# Patient Record
Sex: MALE | Race: WHITE | NOT HISPANIC OR LATINO
[De-identification: names, ages, dates, MRNs, and addresses within clinical notes are randomized per-mention and may not be internally consistent; named-entity substitution may affect disease eponyms.]

---

## 2019-04-12 ENCOUNTER — APPOINTMENT (OUTPATIENT)
Dept: CARDIOLOGY | Facility: CLINIC | Age: 64
End: 2019-04-12
Payer: COMMERCIAL

## 2019-04-12 ENCOUNTER — NON-APPOINTMENT (OUTPATIENT)
Age: 64
End: 2019-04-12

## 2019-04-12 VITALS — DIASTOLIC BLOOD PRESSURE: 66 MMHG | WEIGHT: 220 LBS | BODY MASS INDEX: 36.61 KG/M2 | SYSTOLIC BLOOD PRESSURE: 132 MMHG

## 2019-04-12 VITALS
HEART RATE: 83 BPM | WEIGHT: 228 LBS | SYSTOLIC BLOOD PRESSURE: 156 MMHG | RESPIRATION RATE: 15 BRPM | BODY MASS INDEX: 37.99 KG/M2 | DIASTOLIC BLOOD PRESSURE: 90 MMHG | HEIGHT: 65 IN

## 2019-04-12 DIAGNOSIS — Z00.00 ENCOUNTER FOR GENERAL ADULT MEDICAL EXAMINATION W/OUT ABNORMAL FINDINGS: ICD-10-CM

## 2019-04-12 PROCEDURE — 99244 OFF/OP CNSLTJ NEW/EST MOD 40: CPT

## 2019-04-12 PROCEDURE — 93000 ELECTROCARDIOGRAM COMPLETE: CPT

## 2019-04-15 ENCOUNTER — APPOINTMENT (OUTPATIENT)
Dept: CARDIOLOGY | Facility: CLINIC | Age: 64
End: 2019-04-15
Payer: COMMERCIAL

## 2019-04-15 PROCEDURE — 93306 TTE W/DOPPLER COMPLETE: CPT

## 2019-04-18 ENCOUNTER — APPOINTMENT (OUTPATIENT)
Dept: CARDIOLOGY | Facility: CLINIC | Age: 64
End: 2019-04-18
Payer: COMMERCIAL

## 2019-04-18 ENCOUNTER — MED ADMIN CHARGE (OUTPATIENT)
Age: 64
End: 2019-04-18

## 2019-04-18 PROCEDURE — 78452 HT MUSCLE IMAGE SPECT MULT: CPT

## 2019-04-18 PROCEDURE — 93015 CV STRESS TEST SUPVJ I&R: CPT

## 2019-04-18 PROCEDURE — A9500: CPT

## 2019-04-19 RX ORDER — REGADENOSON 0.08 MG/ML
0.4 INJECTION, SOLUTION INTRAVENOUS
Qty: 1 | Refills: 0 | Status: COMPLETED | OUTPATIENT
Start: 2019-04-19

## 2019-04-19 RX ADMIN — REGADENOSON 0 MG/5ML: 0.08 INJECTION, SOLUTION INTRAVENOUS at 00:00

## 2019-05-03 RX ORDER — KIT FOR THE PREPARATION OF TECHNETIUM TC99M SESTAMIBI 1 MG/5ML
INJECTION, POWDER, LYOPHILIZED, FOR SOLUTION PARENTERAL
Refills: 0 | Status: COMPLETED | OUTPATIENT
Start: 2019-05-03

## 2019-05-03 RX ADMIN — KIT FOR THE PREPARATION OF TECHNETIUM TC99M SESTAMIBI 0: 1 INJECTION, POWDER, LYOPHILIZED, FOR SOLUTION PARENTERAL at 00:00

## 2021-04-21 ENCOUNTER — APPOINTMENT (OUTPATIENT)
Dept: CARDIOLOGY | Facility: CLINIC | Age: 66
End: 2021-04-21
Payer: COMMERCIAL

## 2021-04-21 ENCOUNTER — NON-APPOINTMENT (OUTPATIENT)
Age: 66
End: 2021-04-21

## 2021-04-21 VITALS
WEIGHT: 236 LBS | SYSTOLIC BLOOD PRESSURE: 128 MMHG | HEIGHT: 60 IN | DIASTOLIC BLOOD PRESSURE: 64 MMHG | HEART RATE: 70 BPM | TEMPERATURE: 97.5 F | RESPIRATION RATE: 15 BRPM | BODY MASS INDEX: 46.33 KG/M2

## 2021-04-21 DIAGNOSIS — R09.89 OTHER SPECIFIED SYMPTOMS AND SIGNS INVOLVING THE CIRCULATORY AND RESPIRATORY SYSTEMS: ICD-10-CM

## 2021-04-21 PROCEDURE — 99214 OFFICE O/P EST MOD 30 MIN: CPT

## 2021-04-21 PROCEDURE — 93000 ELECTROCARDIOGRAM COMPLETE: CPT

## 2021-04-21 PROCEDURE — 99072 ADDL SUPL MATRL&STAF TM PHE: CPT

## 2021-04-21 NOTE — REASON FOR VISIT
[Follow-Up - Clinic] : a clinic follow-up of [FreeTextEntry1] : The patient is a 65-year-old  gentleman with known history for borderline hypertension in the past and borderline abnormal EKG with obesity who was evaluated a few years ago for cardiac clearance to undergo total left knee replacement surgery ;\par \par Presents back to the office today for general cardiac checkup. He reports he gets occasional fleeting exertional dyspnea and is trying to get back into shape but has gained additional weight over the past few years and now trying to follow a low carbohydrate diet;

## 2021-04-21 NOTE — HISTORY OF PRESENT ILLNESS
[FreeTextEntry1] : Nuclear stress test in 2017 was negative for ischemia on myocardial perfusion images;\par \par He is tolerating current medical regimen and blood pressure is well controlled;\par \par

## 2021-04-21 NOTE — PHYSICAL EXAM
[Normal Appearance] : normal appearance [Normal Conjunctiva] : the conjunctiva exhibited no abnormalities [Eyelids - No Xanthelasma] : the eyelids demonstrated no xanthelasmas [Normal Oral Mucosa] : normal oral mucosa [No Oral Pallor] : no oral pallor [No Oral Cyanosis] : no oral cyanosis [Normal Jugular Venous A Waves Present] : normal jugular venous A waves present [Normal Jugular Venous V Waves Present] : normal jugular venous V waves present [No Jugular Venous Triana A Waves] : no jugular venous triana A waves [Respiration, Rhythm And Depth] : normal respiratory rhythm and effort [Exaggerated Use Of Accessory Muscles For Inspiration] : no accessory muscle use [Auscultation Breath Sounds / Voice Sounds] : lungs were clear to auscultation bilaterally [Abdomen Soft] : soft [Abdomen Mass (___ Cm)] : no abdominal mass palpated [FreeTextEntry1] : obese, soft, nontender [Abnormal Walk] : normal gait [Gait - Sufficient For Exercise Testing] : the gait was sufficient for exercise testing [Nail Clubbing] : no clubbing of the fingernails [Cyanosis, Localized] : no localized cyanosis [Petechial Hemorrhages (___cm)] : no petechial hemorrhages [Skin Color & Pigmentation] : normal skin color and pigmentation [] : no rash [No Venous Stasis] : no venous stasis [Skin Lesions] : no skin lesions [No Skin Ulcers] : no skin ulcer [No Xanthoma] : no  xanthoma was observed [Oriented To Time, Place, And Person] : oriented to person, place, and time [Affect] : the affect was normal [Mood] : the mood was normal [No Anxiety] : not feeling anxious

## 2021-09-14 ENCOUNTER — APPOINTMENT (OUTPATIENT)
Dept: CARDIOLOGY | Facility: CLINIC | Age: 66
End: 2021-09-14
Payer: COMMERCIAL

## 2021-09-14 PROCEDURE — 93306 TTE W/DOPPLER COMPLETE: CPT

## 2021-09-14 PROCEDURE — 93880 EXTRACRANIAL BILAT STUDY: CPT

## 2021-09-30 ENCOUNTER — APPOINTMENT (OUTPATIENT)
Dept: CARDIOLOGY | Facility: CLINIC | Age: 66
End: 2021-09-30

## 2023-02-16 ENCOUNTER — APPOINTMENT (OUTPATIENT)
Dept: CARDIOLOGY | Facility: CLINIC | Age: 68
End: 2023-02-16
Payer: COMMERCIAL

## 2023-02-16 ENCOUNTER — NON-APPOINTMENT (OUTPATIENT)
Age: 68
End: 2023-02-16

## 2023-02-16 VITALS
HEART RATE: 87 BPM | BODY MASS INDEX: 41.15 KG/M2 | SYSTOLIC BLOOD PRESSURE: 134 MMHG | WEIGHT: 247 LBS | HEIGHT: 65 IN | DIASTOLIC BLOOD PRESSURE: 78 MMHG | RESPIRATION RATE: 16 BRPM

## 2023-02-16 VITALS — DIASTOLIC BLOOD PRESSURE: 90 MMHG | SYSTOLIC BLOOD PRESSURE: 132 MMHG

## 2023-02-16 DIAGNOSIS — I38 ENDOCARDITIS, VALVE UNSPECIFIED: ICD-10-CM

## 2023-02-16 DIAGNOSIS — E66.9 OBESITY, UNSPECIFIED: ICD-10-CM

## 2023-02-16 DIAGNOSIS — E11.9 TYPE 2 DIABETES MELLITUS W/OUT COMPLICATIONS: ICD-10-CM

## 2023-02-16 PROCEDURE — 93000 ELECTROCARDIOGRAM COMPLETE: CPT

## 2023-02-16 PROCEDURE — 99215 OFFICE O/P EST HI 40 MIN: CPT

## 2023-02-27 NOTE — HISTORY OF PRESENT ILLNESS
[FreeTextEntry1] : Nuclear stress test in 2019 was negative for ischemia on myocardial perfusion images;\par \par He is tolerating current medical regimen and blood pressure is well controlled with borderline elevated diastolic pressure;\par \par His PCP (Dr. Blackwell) recently performed fasting labs which demonstrated mild diabetes with glucose (130) and (HgA1C of 6.6%).  His cholesterol was also slightly elevated with Total (213) and LDL (154)

## 2023-02-27 NOTE — ASSESSMENT
[FreeTextEntry1] : EKG shows normal sinus rhythm with frequent PACs at a rate of 87; Anterolateral ST-elevation - repolarization variant.  no acute changes noted; \par \par In summary this 67-year-old  gentleman has a history for total knee replacement surgery, obesity, and borderline hypertension and past with occasional exertional dyspnea;\par \par Recent fasting blood work demonstrated mild diabetes with HgA1C (6.6%), has not discussed this with his PCP yet but will do so in near future ?Metformin;\par \par Blood work also demonstrated an increase in his Total Cholesterol and LDL levels, and now has increased risk of cardiovascular disease with increased age, obesity, diabetes, and borderline hypertension.\par \par \par \par PLAN:\par \par Recommendation schedule echocardiogram study prior to next visit within 3-4 months; \par \par Will empirically have him begin taking Crestor 5 mg nightly.  Repeat fasting labs prior to follow up to reassess cholesterol levels;\par \par Reinforce importance of low-carb, low fat, low-sodium weight reducing diet;\par \par Moderate physical exercise encouraged;\par \par Continued timely checkups and laboratory blood tests with primary care recommend.  Discuss recent diabetes found on blood work in near future ?Metformin;\par \par Recommend patient report any untoward symptoms;\par \par Follow up with Dr. Arguello in 3-4 months or PRN.

## 2023-02-27 NOTE — REASON FOR VISIT
[FreeTextEntry1] : BRIAN FRENCH is being seen for a clinic follow-up of. \par \par The patient is a 67-year-old  gentleman with known history for borderline hypertension in the past and borderline abnormal EKG with obesity who was evaluated a few years ago for cardiac clearance to undergo total left knee replacement surgery ;\par \par Presents back to the office today for general cardiac checkup;\par \par Apparently, he recently underwent total right knee replacement surgery and he is currently going through physical therapy with much improvement;\par \par  He reports he gets occasional fleeting exertional dyspnea and is trying to get back into shape but has gained additional weight over the past few years and now trying to follow a low carbohydrate diet; \par \par Patient denies associated symptoms such as CP, orthopnea, PND, palpitations, presyncope, syncope, edema.

## 2023-06-14 ENCOUNTER — APPOINTMENT (OUTPATIENT)
Dept: CARDIOLOGY | Facility: CLINIC | Age: 68
End: 2023-06-14
Payer: COMMERCIAL

## 2023-06-14 PROCEDURE — 93306 TTE W/DOPPLER COMPLETE: CPT

## 2023-07-18 ENCOUNTER — NON-APPOINTMENT (OUTPATIENT)
Age: 68
End: 2023-07-18

## 2023-07-18 ENCOUNTER — APPOINTMENT (OUTPATIENT)
Dept: CARDIOLOGY | Facility: CLINIC | Age: 68
End: 2023-07-18
Payer: COMMERCIAL

## 2023-07-18 VITALS
HEIGHT: 65 IN | WEIGHT: 235 LBS | SYSTOLIC BLOOD PRESSURE: 120 MMHG | BODY MASS INDEX: 39.15 KG/M2 | RESPIRATION RATE: 16 BRPM | DIASTOLIC BLOOD PRESSURE: 82 MMHG | HEART RATE: 64 BPM

## 2023-07-18 PROCEDURE — 99214 OFFICE O/P EST MOD 30 MIN: CPT

## 2023-07-18 PROCEDURE — 93000 ELECTROCARDIOGRAM COMPLETE: CPT

## 2023-07-18 NOTE — HISTORY OF PRESENT ILLNESS
[FreeTextEntry1] : Patient was placed on rosuvastatin 5 mg daily since his last lipid panel was noted to be elevated cholesterol and LDL cholesterol ;\par \par Transthoracic echocardiogram from June 14, 2023 showed borderline left ventricular hypertrophy with preserved LVEF of 60 to 65%.  There was mild calcification of the aortic leaflets but no aortic stenosis.  There was no pericardial effusion.  The ascending aorta and aortic root was normal in size;\par \par \par nuclear stress test in 2019 was negative for ischemia on myocardial perfusion images;\par \par He is tolerating current medical regimen and blood pressure is well controlled with borderline elevated diastolic pressure;\par \par Patient states that he had recent laboratory blood tests and lipid profile for which we will send for copy of results;

## 2023-07-18 NOTE — ASSESSMENT
[FreeTextEntry1] : EKG shows normal sinus rhythm with frequent PACs at a rate of 64; Anterolateral ST-elevation - repolarization variant.  no acute changes noted; essentially unchanged;\par \par In summary this 68-year-old  gentleman has a history for total knee replacement surgery, obesity, and borderline hypertension and past with occasional exertional dyspnea; he carries some significant cardiac risk factors including borderline diabetes and hyperlipidemia;\par \par Recent fasting blood work demonstrated mild diabetes with HgA1C (6.6%), has not discussed this with his PCP yet but will do so in near future ?Metformin;\par \par Blood work also demonstrated an increase in his Total Cholesterol and LDL levels, and now has increased risk of cardiovascular disease with increased age, obesity, diabetes, and borderline hypertension.\par \par \par \par PLAN:\par \par Recommend patient schedule nuclear stress test with pharmacologic protocol–2-day protocol sometime in the late summer or fall; \par \par Recommend continue Crestor 5 mg nightly.  We will review recent cholesterol profile now while on antilipemic therapy;\par \par Reinforce importance of low-carb, low fat, low-sodium weight reducing diet;\par \par Moderate physical exercise encouraged -especially if nuclear stress test appears favorable;\par \par Continued timely checkups and laboratory blood tests with primary care recommend.  \par \par Recommend patient report any untoward symptoms;\par \par Follow up within 3 to 4 months or post stress test;

## 2023-07-18 NOTE — REASON FOR VISIT
[FreeTextEntry1] : BRIAN FRENCH is being seen for a clinic follow-up of. \par \par The patient is a 68-year-old  gentleman with known history for borderline hypertension in the past and borderline abnormal EKG with obesity who was evaluated a few years ago for cardiac clearance to undergo total left knee replacement surgery ;\par \par Presents back to the office today for general cardiac checkup, and to discuss results of recent cardiovascular testing (echocardiogram;)\par \par Overall, patient reports he has been generally feeling well and has had no significant chest pain, palpitations, dizziness or syncope; he has been trying to lose some weight recently and does admit to occasional exertional dyspnea for which she blames the weight;

## 2023-07-18 NOTE — REVIEW OF SYSTEMS
[Weight Loss (___ Lbs)] : [unfilled] ~Ulb weight loss [Dyspnea on exertion] : dyspnea during exertion [Negative] : Heme/Lymph

## 2023-10-09 ENCOUNTER — RX RENEWAL (OUTPATIENT)
Age: 68
End: 2023-10-09

## 2023-10-09 RX ORDER — ROSUVASTATIN CALCIUM 5 MG/1
5 TABLET, FILM COATED ORAL
Qty: 90 | Refills: 1 | Status: ACTIVE | COMMUNITY
Start: 2023-02-16 | End: 1900-01-01

## 2023-11-14 ENCOUNTER — APPOINTMENT (OUTPATIENT)
Dept: CARDIOLOGY | Facility: CLINIC | Age: 68
End: 2023-11-14

## 2023-11-15 ENCOUNTER — APPOINTMENT (OUTPATIENT)
Dept: CARDIOLOGY | Facility: CLINIC | Age: 68
End: 2023-11-15

## 2023-12-19 ENCOUNTER — APPOINTMENT (OUTPATIENT)
Dept: CARDIOLOGY | Facility: CLINIC | Age: 68
End: 2023-12-19
Payer: COMMERCIAL

## 2023-12-19 ENCOUNTER — NON-APPOINTMENT (OUTPATIENT)
Age: 68
End: 2023-12-19

## 2023-12-19 VITALS
DIASTOLIC BLOOD PRESSURE: 80 MMHG | HEIGHT: 65 IN | BODY MASS INDEX: 38.82 KG/M2 | WEIGHT: 233 LBS | SYSTOLIC BLOOD PRESSURE: 130 MMHG | RESPIRATION RATE: 16 BRPM | OXYGEN SATURATION: 96 % | HEART RATE: 71 BPM

## 2023-12-19 DIAGNOSIS — E78.5 HYPERLIPIDEMIA, UNSPECIFIED: ICD-10-CM

## 2023-12-19 DIAGNOSIS — R94.31 ABNORMAL ELECTROCARDIOGRAM [ECG] [EKG]: ICD-10-CM

## 2023-12-19 DIAGNOSIS — R06.02 SHORTNESS OF BREATH: ICD-10-CM

## 2023-12-19 DIAGNOSIS — I10 ESSENTIAL (PRIMARY) HYPERTENSION: ICD-10-CM

## 2023-12-19 PROCEDURE — 93000 ELECTROCARDIOGRAM COMPLETE: CPT

## 2023-12-19 PROCEDURE — 99214 OFFICE O/P EST MOD 30 MIN: CPT

## 2023-12-19 NOTE — REASON FOR VISIT
[FreeTextEntry1] : BRIAN FRENCH is being seen for a clinic follow-up of.   The patient is a 68-year-old  gentleman with known history for borderline hypertension in the past and borderline abnormal EKG, and heart murmur- with obesity who was evaluated a few years ago for cardiac clearance to undergo total left knee replacement surgery ;  Presents back to the office today for general cardiac checkup;  Overall, patient reports he has been generally feeling well and has had no significant chest pain, palpitations, dizziness or syncope; he has been trying to lose some weight recently and does admit to occasional exertional dyspnea for which he blames the weight;

## 2023-12-19 NOTE — PHYSICAL EXAM
[Well Developed] : well developed [No Acute Distress] : no acute distress [Obese] : obese [Normal Conjunctiva] : normal conjunctiva [Normal Venous Pressure] : normal venous pressure [No Carotid Bruit] : no carotid bruit [Normal S1, S2] : normal S1, S2 [No Rub] : no rub [No Gallop] : no gallop [Clear Lung Fields] : clear lung fields [Good Air Entry] : good air entry [No Respiratory Distress] : no respiratory distress  [Soft] : abdomen soft [Non Tender] : non-tender [No Masses/organomegaly] : no masses/organomegaly [Normal Gait] : normal gait [No Edema] : no edema [No Cyanosis] : no cyanosis [No Clubbing] : no clubbing [No Rash] : no rash [No Skin Lesions] : no skin lesions [Moves all extremities] : moves all extremities [No Focal Deficits] : no focal deficits [Normal Speech] : normal speech [Alert and Oriented] : alert and oriented [Normal memory] : normal memory [de-identified] : Regular rhythm, grade 1/6 to 2/6 systolic murmur increased at the right parasternal border;

## 2023-12-19 NOTE — ASSESSMENT
[FreeTextEntry1] : EKG demonstrating normal sinus rhythm at a rate of 71; nonspecific early J-point ST elevations noted in the anteroseptal leads of questionable significance.  Otherwise unchanged from previous;  In summary, this 68-year-old gentleman has a known history of a heart murmur likely secondary to mild aortic sclerosis without stenosis from previous echo noted.  He has had occasional exertional dyspnea which he believes is secondary to his excessive weight for which he is having difficulty losing any meaningful weight;  He has a history of abnormal EKG which has been fairly stable;  Plan:  Have recommended patient schedule nuclear stress test with pharmacologic protocol to rule out any significant CAD and clear patient to increase his exercise tolerance;  Agree with continuing rosuvastatin and recommend timely checkups and laboratory blood test including lipid panel with primary care;  Return to our office within 5 to 6 months or as needed

## 2023-12-19 NOTE — HISTORY OF PRESENT ILLNESS
[FreeTextEntry1] : Patient reports she recently returned from a vacation trip with his family from [place] and said he did well ;  He remains on rosuvastatin 5 mg daily since his last lipid panel was noted to be elevated cholesterol and LDL cholesterol ;  Transthoracic echocardiogram from June 14, 2023 showed borderline left ventricular hypertrophy with preserved LVEF of 60 to 65%.  There was mild calcification of the aortic leaflets but no aortic stenosis.  There was no pericardial effusion.  The ascending aorta and aortic root was normal in size;   Last nuclear stress test in 2019 was negative for ischemia on myocardial perfusion images;

## 2024-05-01 ENCOUNTER — APPOINTMENT (OUTPATIENT)
Dept: CARDIOLOGY | Facility: CLINIC | Age: 69
End: 2024-05-01

## 2024-05-02 ENCOUNTER — APPOINTMENT (OUTPATIENT)
Dept: CARDIOLOGY | Facility: CLINIC | Age: 69
End: 2024-05-02

## 2024-05-16 ENCOUNTER — APPOINTMENT (OUTPATIENT)
Dept: CARDIOLOGY | Facility: CLINIC | Age: 69
End: 2024-05-16

## 2024-08-02 ENCOUNTER — APPOINTMENT (OUTPATIENT)
Dept: CARDIOLOGY | Facility: CLINIC | Age: 69
End: 2024-08-02